# Patient Record
Sex: FEMALE | Race: WHITE | NOT HISPANIC OR LATINO | URBAN - METROPOLITAN AREA
[De-identification: names, ages, dates, MRNs, and addresses within clinical notes are randomized per-mention and may not be internally consistent; named-entity substitution may affect disease eponyms.]

---

## 2017-10-15 ENCOUNTER — EMERGENCY (EMERGENCY)
Facility: HOSPITAL | Age: 22
LOS: 0 days | Discharge: HOME | End: 2017-10-15

## 2017-10-15 DIAGNOSIS — R11.10 VOMITING, UNSPECIFIED: ICD-10-CM

## 2017-10-15 DIAGNOSIS — R10.9 UNSPECIFIED ABDOMINAL PAIN: ICD-10-CM

## 2017-10-15 DIAGNOSIS — R74.0 NONSPECIFIC ELEVATION OF LEVELS OF TRANSAMINASE AND LACTIC ACID DEHYDROGENASE [LDH]: ICD-10-CM

## 2017-10-15 DIAGNOSIS — F41.9 ANXIETY DISORDER, UNSPECIFIED: ICD-10-CM

## 2022-08-03 ENCOUNTER — INPATIENT (INPATIENT)
Facility: HOSPITAL | Age: 27
LOS: 0 days | Discharge: HOME HEALTH | End: 2022-08-04
Attending: SURGERY | Admitting: SURGERY

## 2022-08-03 ENCOUNTER — TRANSCRIPTION ENCOUNTER (OUTPATIENT)
Age: 27
End: 2022-08-03

## 2022-08-03 VITALS
SYSTOLIC BLOOD PRESSURE: 146 MMHG | DIASTOLIC BLOOD PRESSURE: 71 MMHG | HEART RATE: 104 BPM | OXYGEN SATURATION: 100 % | RESPIRATION RATE: 20 BRPM | TEMPERATURE: 100 F

## 2022-08-03 DIAGNOSIS — K61.1 RECTAL ABSCESS: Chronic | ICD-10-CM

## 2022-08-03 LAB
ALBUMIN SERPL ELPH-MCNC: 4.3 G/DL — SIGNIFICANT CHANGE UP (ref 3.5–5.2)
ALP SERPL-CCNC: 70 U/L — SIGNIFICANT CHANGE UP (ref 30–115)
ALT FLD-CCNC: 10 U/L — SIGNIFICANT CHANGE UP (ref 0–41)
ANION GAP SERPL CALC-SCNC: 15 MMOL/L — HIGH (ref 7–14)
APTT BLD: 22.5 SEC — CRITICAL LOW (ref 27–39.2)
AST SERPL-CCNC: 23 U/L — SIGNIFICANT CHANGE UP (ref 0–41)
BASOPHILS # BLD AUTO: 0.03 K/UL — SIGNIFICANT CHANGE UP (ref 0–0.2)
BASOPHILS NFR BLD AUTO: 0.1 % — SIGNIFICANT CHANGE UP (ref 0–1)
BILIRUB SERPL-MCNC: 0.5 MG/DL — SIGNIFICANT CHANGE UP (ref 0.2–1.2)
BLD GP AB SCN SERPL QL: SIGNIFICANT CHANGE UP
BUN SERPL-MCNC: 16 MG/DL — SIGNIFICANT CHANGE UP (ref 10–20)
CALCIUM SERPL-MCNC: 9.6 MG/DL — SIGNIFICANT CHANGE UP (ref 8.5–10.1)
CHLORIDE SERPL-SCNC: 101 MMOL/L — SIGNIFICANT CHANGE UP (ref 98–110)
CO2 SERPL-SCNC: 22 MMOL/L — SIGNIFICANT CHANGE UP (ref 17–32)
CREAT SERPL-MCNC: 2.1 MG/DL — HIGH (ref 0.7–1.5)
EGFR: 33 ML/MIN/1.73M2 — LOW
EOSINOPHIL # BLD AUTO: 0.01 K/UL — SIGNIFICANT CHANGE UP (ref 0–0.7)
EOSINOPHIL NFR BLD AUTO: 0 % — SIGNIFICANT CHANGE UP (ref 0–8)
GLUCOSE SERPL-MCNC: 121 MG/DL — HIGH (ref 70–99)
HCG SERPL QL: NEGATIVE — SIGNIFICANT CHANGE UP
HCT VFR BLD CALC: 35.6 % — LOW (ref 37–47)
HGB BLD-MCNC: 11.8 G/DL — LOW (ref 12–16)
IMM GRANULOCYTES NFR BLD AUTO: 0.7 % — HIGH (ref 0.1–0.3)
INR BLD: 1.41 RATIO — HIGH (ref 0.65–1.3)
LACTATE SERPL-SCNC: 1.6 MMOL/L — SIGNIFICANT CHANGE UP (ref 0.7–2)
LYMPHOCYTES # BLD AUTO: 1.27 K/UL — SIGNIFICANT CHANGE UP (ref 1.2–3.4)
LYMPHOCYTES # BLD AUTO: 6.1 % — LOW (ref 20.5–51.1)
MCHC RBC-ENTMCNC: 26.5 PG — LOW (ref 27–31)
MCHC RBC-ENTMCNC: 33.1 G/DL — SIGNIFICANT CHANGE UP (ref 32–37)
MCV RBC AUTO: 79.8 FL — LOW (ref 81–99)
MONOCYTES # BLD AUTO: 1.69 K/UL — HIGH (ref 0.1–0.6)
MONOCYTES NFR BLD AUTO: 8.2 % — SIGNIFICANT CHANGE UP (ref 1.7–9.3)
NEUTROPHILS # BLD AUTO: 17.55 K/UL — HIGH (ref 1.4–6.5)
NEUTROPHILS NFR BLD AUTO: 84.9 % — HIGH (ref 42.2–75.2)
NRBC # BLD: 0 /100 WBCS — SIGNIFICANT CHANGE UP (ref 0–0)
PLATELET # BLD AUTO: 384 K/UL — SIGNIFICANT CHANGE UP (ref 130–400)
POTASSIUM SERPL-MCNC: 4.3 MMOL/L — SIGNIFICANT CHANGE UP (ref 3.5–5)
POTASSIUM SERPL-SCNC: 4.3 MMOL/L — SIGNIFICANT CHANGE UP (ref 3.5–5)
PROT SERPL-MCNC: 7.1 G/DL — SIGNIFICANT CHANGE UP (ref 6–8)
PROTHROM AB SERPL-ACNC: 16.2 SEC — HIGH (ref 9.95–12.87)
RBC # BLD: 4.46 M/UL — SIGNIFICANT CHANGE UP (ref 4.2–5.4)
RBC # FLD: 15.1 % — HIGH (ref 11.5–14.5)
SARS-COV-2 RNA SPEC QL NAA+PROBE: SIGNIFICANT CHANGE UP
SODIUM SERPL-SCNC: 138 MMOL/L — SIGNIFICANT CHANGE UP (ref 135–146)
WBC # BLD: 20.7 K/UL — HIGH (ref 4.8–10.8)
WBC # FLD AUTO: 20.7 K/UL — HIGH (ref 4.8–10.8)

## 2022-08-03 PROCEDURE — 99285 EMERGENCY DEPT VISIT HI MDM: CPT

## 2022-08-03 PROCEDURE — 99222 1ST HOSP IP/OBS MODERATE 55: CPT

## 2022-08-03 PROCEDURE — 74177 CT ABD & PELVIS W/CONTRAST: CPT | Mod: 26,MA

## 2022-08-03 RX ORDER — ONDANSETRON 8 MG/1
4 TABLET, FILM COATED ORAL ONCE
Refills: 0 | Status: COMPLETED | OUTPATIENT
Start: 2022-08-03 | End: 2022-08-03

## 2022-08-03 RX ORDER — HYDROMORPHONE HYDROCHLORIDE 2 MG/ML
0.25 INJECTION INTRAMUSCULAR; INTRAVENOUS; SUBCUTANEOUS EVERY 4 HOURS
Refills: 0 | Status: DISCONTINUED | OUTPATIENT
Start: 2022-08-03 | End: 2022-08-03

## 2022-08-03 RX ORDER — KETOROLAC TROMETHAMINE 30 MG/ML
15 SYRINGE (ML) INJECTION ONCE
Refills: 0 | Status: DISCONTINUED | OUTPATIENT
Start: 2022-08-03 | End: 2022-08-03

## 2022-08-03 RX ORDER — CEFTRIAXONE 500 MG/1
1000 INJECTION, POWDER, FOR SOLUTION INTRAMUSCULAR; INTRAVENOUS ONCE
Refills: 0 | Status: COMPLETED | OUTPATIENT
Start: 2022-08-03 | End: 2022-08-03

## 2022-08-03 RX ORDER — CEFTRIAXONE 500 MG/1
INJECTION, POWDER, FOR SOLUTION INTRAMUSCULAR; INTRAVENOUS
Refills: 0 | Status: DISCONTINUED | OUTPATIENT
Start: 2022-08-03 | End: 2022-08-03

## 2022-08-03 RX ORDER — CEFTRIAXONE 500 MG/1
1000 INJECTION, POWDER, FOR SOLUTION INTRAMUSCULAR; INTRAVENOUS EVERY 24 HOURS
Refills: 0 | Status: CANCELLED | OUTPATIENT
Start: 2022-08-04 | End: 2022-08-03

## 2022-08-03 RX ORDER — ACETAMINOPHEN 500 MG
650 TABLET ORAL EVERY 6 HOURS
Refills: 0 | Status: DISCONTINUED | OUTPATIENT
Start: 2022-08-03 | End: 2022-08-03

## 2022-08-03 RX ORDER — SODIUM CHLORIDE 9 MG/ML
1000 INJECTION INTRAMUSCULAR; INTRAVENOUS; SUBCUTANEOUS ONCE
Refills: 0 | Status: COMPLETED | OUTPATIENT
Start: 2022-08-03 | End: 2022-08-03

## 2022-08-03 RX ORDER — OXYCODONE HYDROCHLORIDE 5 MG/1
5 TABLET ORAL EVERY 6 HOURS
Refills: 0 | Status: DISCONTINUED | OUTPATIENT
Start: 2022-08-03 | End: 2022-08-03

## 2022-08-03 RX ORDER — SODIUM CHLORIDE 9 MG/ML
1000 INJECTION, SOLUTION INTRAVENOUS ONCE
Refills: 0 | Status: COMPLETED | OUTPATIENT
Start: 2022-08-03 | End: 2022-08-03

## 2022-08-03 RX ORDER — SODIUM CHLORIDE 9 MG/ML
1000 INJECTION, SOLUTION INTRAVENOUS
Refills: 0 | Status: DISCONTINUED | OUTPATIENT
Start: 2022-08-03 | End: 2022-08-03

## 2022-08-03 RX ORDER — IBUPROFEN 200 MG
400 TABLET ORAL EVERY 8 HOURS
Refills: 0 | Status: DISCONTINUED | OUTPATIENT
Start: 2022-08-03 | End: 2022-08-03

## 2022-08-03 RX ORDER — HEPARIN SODIUM 5000 [USP'U]/ML
5000 INJECTION INTRAVENOUS; SUBCUTANEOUS EVERY 8 HOURS
Refills: 0 | Status: DISCONTINUED | OUTPATIENT
Start: 2022-08-03 | End: 2022-08-03

## 2022-08-03 RX ADMIN — Medication 100 MILLIGRAM(S): at 12:40

## 2022-08-03 RX ADMIN — CEFTRIAXONE 100 MILLIGRAM(S): 500 INJECTION, POWDER, FOR SOLUTION INTRAMUSCULAR; INTRAVENOUS at 20:40

## 2022-08-03 RX ADMIN — SODIUM CHLORIDE 1000 MILLILITER(S): 9 INJECTION INTRAMUSCULAR; INTRAVENOUS; SUBCUTANEOUS at 10:57

## 2022-08-03 RX ADMIN — SODIUM CHLORIDE 1000 MILLILITER(S): 9 INJECTION, SOLUTION INTRAVENOUS at 12:44

## 2022-08-03 RX ADMIN — ONDANSETRON 4 MILLIGRAM(S): 8 TABLET, FILM COATED ORAL at 10:57

## 2022-08-03 RX ADMIN — Medication 15 MILLIGRAM(S): at 11:50

## 2022-08-03 RX ADMIN — SODIUM CHLORIDE 125 MILLILITER(S): 9 INJECTION, SOLUTION INTRAVENOUS at 20:41

## 2022-08-03 NOTE — H&P ADULT - ATTENDING COMMENTS
Patient seen and examined with surgery resident in [place] awaiting bed and discussed management plans with patient and mother by bedside who speaks Egyptian.  Pain for 7-10 days seen as outpatient with local I & D and packing without relief on antibiotics. Clinically Right Perirectal abscess with small skin opening only. CT scan images reviewed and confirmed. Patient to go to OR for adequate drainage under general anesthesia as patient also had poor experience in [place] under local anesthesia

## 2022-08-03 NOTE — H&P ADULT - HISTORY OF PRESENT ILLNESS
27 y/o F with pmhx of previous perirectal abscess s/p I&D in 2018 now presents with 1 week of rectal pain. She was seen by a surgeon in [place] and was given a course of PO Clindamycin. Symptoms did not improve so she returned 3 days ago and underwent a small bedside I&D and was sent home. She denies bloody BMs or pain with defecation. Today, patient presented to [facility] ED for failure of symptoms to improve as well as a new onset of nausea, vomiting and fever/chills. CT scan showed a fluid collection on thr right extending to the rectum and anus.  27 y/o F with pmhx of previous perirectal abscess s/p I&D in 2018 now presents with 1 week of rectal pain. She was seen by a surgeon Dr. Chaudhry in [place] and was given a course of PO Clindamycin. Symptoms did not improve so she returned 3 days ago and underwent a small bedside I&D and was sent home. She denies bloody BMs or pain with defecation. Today, patient presented to [facility] ED for failure of symptoms to improve as well as a new onset of nausea, vomiting and fever/chills. CT scan showed a fluid collection on the right extending to the rectum and anus.

## 2022-08-03 NOTE — H&P ADULT - PATIENT'S GENDER IDENTITY
Activity:   Elevate your leg if swelling occurs in your ankle. Use elastic wraps/hose until swelling decreases.  Continue the exercise program as prescribed by physical therapists.  Take frequent walks.  Use walker, crutches, or cane with weight bearing instructions as indicated by the physical therapists.  Take rest periods often. Elevate leg during rest period.
Female

## 2022-08-03 NOTE — ED ADULT TRIAGE NOTE - CHIEF COMPLAINT QUOTE
+rectal abscess, patient states she had it slightly drained by her MD yesterday, +fever, patient on po abx

## 2022-08-03 NOTE — H&P ADULT - NSHPLABSRESULTS_GEN_ALL_CORE
Labs:  CAPILLARY BLOOD GLUCOSE                              11.8   20.70 )-----------( 384      ( 03 Aug 2022 10:36 )             35.6       Auto Neutrophil %: 84.9 % (08-03-22 @ 10:36)  Auto Immature Granulocyte %: 0.7 % (08-03-22 @ 10:36)    08-03    138  |  101  |  16  ----------------------------<  121<H>  4.3   |  22  |  2.1<H>      Calcium, Total Serum: 9.6 mg/dL (08-03-22 @ 10:36)      LFTs:             7.1  | 0.5  | 23       ------------------[70      ( 03 Aug 2022 10:36 )  4.3  | x    | 10          Lipase:x      Amylase:x         Lactate, Blood: 1.6 mmol/L (08-03-22 @ 10:36)      Coags:      RADIOLOGY:   < from: CT Abdomen and Pelvis w/ IV Cont (08.03.22 @ 12:25) >    :Irregular enhancing fluid collection within the perineum   asymmetric on the right extending to the anus and the rectum on the right.    Mild hepatosplenomegaly.    < end of copied text >

## 2022-08-03 NOTE — ED PROVIDER NOTE - NS ED ROS FT
Constitutional: + subjective fever/chills  Eyes: no redness/discharge/pain/vision changes  ENT: no rhinorrhea/ear pain/sore throat  Cardiac: No chest pain, SOB or edema.  Respiratory: No cough or respiratory distress  GI: + nbnb vomiting. No diarrhea or abdominal pain.  : No dysuria, frequency, urgency or hematuria  MS: no pain to back or extremities, no loss of ROM, no weakness  Neuro: No headache or weakness. No LOC.  Skin: No skin rash.  Endocrine: No history of thyroid disease or diabetes.  Except as documented in the HPI, all other systems are negative.

## 2022-08-03 NOTE — H&P ADULT - ASSESSMENT
25 y/o F with pmhx of previous perirectal abscess in 2018 presents with rectal pain. CT scan shows irregular fluid collection on right side of perineum extending to rectum and anus. WBC is 20. Patient very tender on exam in the area.     Plan:   NPO, IVF  pain and fever control   IV rocephin   add on and consent for OR for I&D of perirectal abscess    Above discussed with Surgical team, Attending, Dr. Monahan and patient.

## 2022-08-03 NOTE — ED PROVIDER NOTE - OBJECTIVE STATEMENT
26 year old F with hx of rectal abscess 2018 s/p I &D c/o rectal abscess x 1.5 week. Pt went to see surgeon in [place] last Thursday and was started on Clindamycin. Symptoms were not improving so returned to office yesterday had I &D performed with packing placed. Since last night pt has had worsening pain to area, subjective fever/chills, nausea, vomiting and lower abd cramping. No bloody stools, chest pain, sob, lightheadedness, syncope, urinary symptoms.

## 2022-08-03 NOTE — ED PROVIDER NOTE - NSTIMEPROVIDERCAREINITIATE_GEN_ER
03-Aug-2022 10:05 patient presents for COVID exposure.  As patient is nontoxic appearing will test for COVID and d/c.  Quarantine reviewed and return precautions reviewed.

## 2022-08-03 NOTE — ED PROVIDER NOTE - NS ED ATTENDING STATEMENT MOD
This was a shared visit with the WINIFRED. I reviewed and verified the documentation and independently performed the documented:

## 2022-08-03 NOTE — ED PROVIDER NOTE - CLINICAL SUMMARY MEDICAL DECISION MAKING FREE TEXT BOX
CT confirms deep space perineal abscess bordering the anus and rectum.  Surgery consulted.  ADMIT.  For OR intervention this PM.  VSS.

## 2022-08-03 NOTE — ED PROVIDER NOTE - SHIFT CHANGE DETAILS
Patient with CT evidence of perirectal abscess.  WBC = 20K.  IV abx given.  Surgery consult pending.

## 2022-08-03 NOTE — H&P ADULT - NSHPPHYSICALEXAM_GEN_ALL_CORE
PHYSICAL EXAM:  GENERAL: NAD, well-appearing  CHEST/LUNG: Clear to auscultation bilaterally  HEART: Regular rate and rhythm  ABDOMEN: Soft, Nontender, Nondistended;   EXTREMITIES:  No clubbing, cyanosis, or edema  RECTAL: tenderness to palpation right perianal region, mild surrounding erythema PHYSICAL EXAM:  GENERAL: NAD, well-appearing  CHEST/LUNG: Clear to auscultation bilaterally  HEART: Regular rate and rhythm  ABDOMEN: Soft, Nontender, Nondistended;   EXTREMITIES:  No clubbing, cyanosis, or edema  RECTAL: tenderness to palpation right perianal region, mild surrounding erythema small packing superficial in area

## 2022-08-03 NOTE — ED PROVIDER NOTE - PHYSICAL EXAMINATION
CONSTITUTIONAL: Well-appearing; well-nourished; in no apparent distress.   EYES: PERRL; EOM intact.   ENT: normal nose; no rhinorrhea; normal pharynx with no tonsillar hypertrophy.   NECK: Supple; non-tender; no cervical lymphadenopathy. N  CARDIOVASCULAR: Normal S1, S2; no murmurs, rubs, or gallops.   RESPIRATORY: Normal chest excursion with respiration; breath sounds clear and equal bilaterally; no wheezes, rhonchi, or rales.  GI/: Normal bowel sounds; non-distended; non-tender; no palpable organomegaly. Rectal exam chaperoned by Dr. Florez: + perirectal abscess 2 x 2 cm noted at 1o clock. + packing in place. No bleeding no drainage.   MS: No evidence of trauma or deformity. Normal ROM in all four extremities; non-tender to palpation; distal pulses are normal.   SKIN: Normal for age and race; warm; dry; good turgor; no apparent lesions or exudate.   NEURO/PSYCH: A & O x 4; grossly unremarkable. mood and manner are appropriate.

## 2022-08-04 ENCOUNTER — TRANSCRIPTION ENCOUNTER (OUTPATIENT)
Age: 27
End: 2022-08-04

## 2022-08-04 VITALS
TEMPERATURE: 97 F | RESPIRATION RATE: 18 BRPM | HEART RATE: 81 BPM | OXYGEN SATURATION: 98 % | DIASTOLIC BLOOD PRESSURE: 85 MMHG | SYSTOLIC BLOOD PRESSURE: 136 MMHG

## 2022-08-04 LAB
ANION GAP SERPL CALC-SCNC: 11 MMOL/L — SIGNIFICANT CHANGE UP (ref 7–14)
BASOPHILS # BLD AUTO: 0.04 K/UL — SIGNIFICANT CHANGE UP (ref 0–0.2)
BASOPHILS NFR BLD AUTO: 0.2 % — SIGNIFICANT CHANGE UP (ref 0–1)
BUN SERPL-MCNC: 14 MG/DL — SIGNIFICANT CHANGE UP (ref 10–20)
CALCIUM SERPL-MCNC: 9.3 MG/DL — SIGNIFICANT CHANGE UP (ref 8.5–10.1)
CHLORIDE SERPL-SCNC: 104 MMOL/L — SIGNIFICANT CHANGE UP (ref 98–110)
CO2 SERPL-SCNC: 22 MMOL/L — SIGNIFICANT CHANGE UP (ref 17–32)
CREAT SERPL-MCNC: 1.2 MG/DL — SIGNIFICANT CHANGE UP (ref 0.7–1.5)
EGFR: 64 ML/MIN/1.73M2 — SIGNIFICANT CHANGE UP
EOSINOPHIL # BLD AUTO: 0.06 K/UL — SIGNIFICANT CHANGE UP (ref 0–0.7)
EOSINOPHIL NFR BLD AUTO: 0.3 % — SIGNIFICANT CHANGE UP (ref 0–8)
GLUCOSE SERPL-MCNC: 130 MG/DL — HIGH (ref 70–99)
HCT VFR BLD CALC: 31.6 % — LOW (ref 37–47)
HGB BLD-MCNC: 10.4 G/DL — LOW (ref 12–16)
IMM GRANULOCYTES NFR BLD AUTO: 0.7 % — HIGH (ref 0.1–0.3)
LYMPHOCYTES # BLD AUTO: 1.1 K/UL — LOW (ref 1.2–3.4)
LYMPHOCYTES # BLD AUTO: 4.8 % — LOW (ref 20.5–51.1)
MAGNESIUM SERPL-MCNC: 2.1 MG/DL — SIGNIFICANT CHANGE UP (ref 1.8–2.4)
MCHC RBC-ENTMCNC: 26.5 PG — LOW (ref 27–31)
MCHC RBC-ENTMCNC: 32.9 G/DL — SIGNIFICANT CHANGE UP (ref 32–37)
MCV RBC AUTO: 80.6 FL — LOW (ref 81–99)
MONOCYTES # BLD AUTO: 0.92 K/UL — HIGH (ref 0.1–0.6)
MONOCYTES NFR BLD AUTO: 4 % — SIGNIFICANT CHANGE UP (ref 1.7–9.3)
NEUTROPHILS # BLD AUTO: 20.59 K/UL — HIGH (ref 1.4–6.5)
NEUTROPHILS NFR BLD AUTO: 90 % — HIGH (ref 42.2–75.2)
NRBC # BLD: 0 /100 WBCS — SIGNIFICANT CHANGE UP (ref 0–0)
PHOSPHATE SERPL-MCNC: 5.6 MG/DL — HIGH (ref 2.1–4.9)
PLATELET # BLD AUTO: 343 K/UL — SIGNIFICANT CHANGE UP (ref 130–400)
POTASSIUM SERPL-MCNC: 4.3 MMOL/L — SIGNIFICANT CHANGE UP (ref 3.5–5)
POTASSIUM SERPL-SCNC: 4.3 MMOL/L — SIGNIFICANT CHANGE UP (ref 3.5–5)
RBC # BLD: 3.92 M/UL — LOW (ref 4.2–5.4)
RBC # FLD: 15.2 % — HIGH (ref 11.5–14.5)
SODIUM SERPL-SCNC: 137 MMOL/L — SIGNIFICANT CHANGE UP (ref 135–146)
WBC # BLD: 22.86 K/UL — HIGH (ref 4.8–10.8)
WBC # FLD AUTO: 22.86 K/UL — HIGH (ref 4.8–10.8)

## 2022-08-04 PROCEDURE — 46040 I&D ISCHIORCT&/PERIRCT ABSC: CPT

## 2022-08-04 RX ORDER — ACETAMINOPHEN 500 MG
2 TABLET ORAL
Qty: 56 | Refills: 0
Start: 2022-08-04 | End: 2022-08-10

## 2022-08-04 RX ORDER — MOXIFLOXACIN HYDROCHLORIDE TABLETS, 400 MG 400 MG/1
1 TABLET, FILM COATED ORAL
Qty: 14 | Refills: 0
Start: 2022-08-04 | End: 2022-08-10

## 2022-08-04 RX ORDER — OXYCODONE HYDROCHLORIDE 5 MG/1
1 TABLET ORAL
Qty: 12 | Refills: 0
Start: 2022-08-04 | End: 2022-08-06

## 2022-08-04 RX ORDER — HYDROMORPHONE HYDROCHLORIDE 2 MG/ML
0.25 INJECTION INTRAMUSCULAR; INTRAVENOUS; SUBCUTANEOUS EVERY 4 HOURS
Refills: 0 | Status: DISCONTINUED | OUTPATIENT
Start: 2022-08-04 | End: 2022-08-04

## 2022-08-04 RX ORDER — METRONIDAZOLE 500 MG
100 TABLET ORAL
Qty: 0 | Refills: 0 | DISCHARGE
Start: 2022-08-04

## 2022-08-04 RX ORDER — OXYCODONE HYDROCHLORIDE 5 MG/1
5 TABLET ORAL EVERY 6 HOURS
Refills: 0 | Status: DISCONTINUED | OUTPATIENT
Start: 2022-08-04 | End: 2022-08-04

## 2022-08-04 RX ORDER — METRONIDAZOLE 500 MG
1 TABLET ORAL
Qty: 21 | Refills: 0
Start: 2022-08-04 | End: 2022-08-10

## 2022-08-04 RX ORDER — METRONIDAZOLE 500 MG
500 TABLET ORAL EVERY 8 HOURS
Refills: 0 | Status: DISCONTINUED | OUTPATIENT
Start: 2022-08-04 | End: 2022-08-04

## 2022-08-04 RX ORDER — HEPARIN SODIUM 5000 [USP'U]/ML
5000 INJECTION INTRAVENOUS; SUBCUTANEOUS EVERY 8 HOURS
Refills: 0 | Status: DISCONTINUED | OUTPATIENT
Start: 2022-08-04 | End: 2022-08-04

## 2022-08-04 RX ORDER — SODIUM CHLORIDE 9 MG/ML
1000 INJECTION, SOLUTION INTRAVENOUS
Refills: 0 | Status: DISCONTINUED | OUTPATIENT
Start: 2022-08-04 | End: 2022-08-04

## 2022-08-04 RX ORDER — CEFTRIAXONE 500 MG/1
1000 INJECTION, POWDER, FOR SOLUTION INTRAMUSCULAR; INTRAVENOUS EVERY 24 HOURS
Refills: 0 | Status: DISCONTINUED | OUTPATIENT
Start: 2022-08-04 | End: 2022-08-04

## 2022-08-04 RX ORDER — ACETAMINOPHEN 500 MG
650 TABLET ORAL EVERY 6 HOURS
Refills: 0 | Status: DISCONTINUED | OUTPATIENT
Start: 2022-08-04 | End: 2022-08-04

## 2022-08-04 RX ORDER — HYDROMORPHONE HYDROCHLORIDE 2 MG/ML
0.5 INJECTION INTRAMUSCULAR; INTRAVENOUS; SUBCUTANEOUS ONCE
Refills: 0 | Status: DISCONTINUED | OUTPATIENT
Start: 2022-08-04 | End: 2022-08-04

## 2022-08-04 RX ORDER — IBUPROFEN 200 MG
1 TABLET ORAL
Qty: 12 | Refills: 0
Start: 2022-08-04 | End: 2022-08-07

## 2022-08-04 RX ORDER — HYDROMORPHONE HYDROCHLORIDE 2 MG/ML
0.5 INJECTION INTRAMUSCULAR; INTRAVENOUS; SUBCUTANEOUS
Refills: 0 | Status: DISCONTINUED | OUTPATIENT
Start: 2022-08-04 | End: 2022-08-04

## 2022-08-04 RX ADMIN — Medication 650 MILLIGRAM(S): at 18:44

## 2022-08-04 RX ADMIN — Medication 650 MILLIGRAM(S): at 06:50

## 2022-08-04 RX ADMIN — OXYCODONE HYDROCHLORIDE 5 MILLIGRAM(S): 5 TABLET ORAL at 08:11

## 2022-08-04 RX ADMIN — HYDROMORPHONE HYDROCHLORIDE 0.5 MILLIGRAM(S): 2 INJECTION INTRAMUSCULAR; INTRAVENOUS; SUBCUTANEOUS at 01:20

## 2022-08-04 RX ADMIN — SODIUM CHLORIDE 125 MILLILITER(S): 9 INJECTION, SOLUTION INTRAVENOUS at 10:00

## 2022-08-04 RX ADMIN — CEFTRIAXONE 100 MILLIGRAM(S): 500 INJECTION, POWDER, FOR SOLUTION INTRAMUSCULAR; INTRAVENOUS at 01:30

## 2022-08-04 RX ADMIN — OXYCODONE HYDROCHLORIDE 5 MILLIGRAM(S): 5 TABLET ORAL at 08:12

## 2022-08-04 RX ADMIN — HYDROMORPHONE HYDROCHLORIDE 0.5 MILLIGRAM(S): 2 INJECTION INTRAMUSCULAR; INTRAVENOUS; SUBCUTANEOUS at 18:41

## 2022-08-04 RX ADMIN — HYDROMORPHONE HYDROCHLORIDE 0.25 MILLIGRAM(S): 2 INJECTION INTRAMUSCULAR; INTRAVENOUS; SUBCUTANEOUS at 13:45

## 2022-08-04 RX ADMIN — Medication 650 MILLIGRAM(S): at 07:36

## 2022-08-04 RX ADMIN — HYDROMORPHONE HYDROCHLORIDE 0.5 MILLIGRAM(S): 2 INJECTION INTRAMUSCULAR; INTRAVENOUS; SUBCUTANEOUS at 01:05

## 2022-08-04 NOTE — DISCHARGE NOTE PROVIDER - NSDCCPTREATMENT_GEN_ALL_CORE_FT
PRINCIPAL PROCEDURE  Procedure: Incision, perianal abscess  Findings and Treatment: 35cc of pus drained

## 2022-08-04 NOTE — PROGRESS NOTE ADULT - ATTENDING COMMENTS
Patient seen and examined with surgery PA on rounds and discussed management plans.   comfortable dressing in place Packing to be changed today prior to discharge on po antibiotics and outpatient followup instructions given

## 2022-08-04 NOTE — PATIENT PROFILE ADULT - FALL HARM RISK - HARM RISK INTERVENTIONS

## 2022-08-04 NOTE — DISCHARGE NOTE NURSING/CASE MANAGEMENT/SOCIAL WORK - PATIENT PORTAL LINK FT
You can access the FollowMyHealth Patient Portal offered by [facility] by registering at the following website: [email or web address]. By joining [facility]’s FollowMyHealth portal, you will also be able to view your health information using other applications (apps) compatible with our system.

## 2022-08-04 NOTE — CHART NOTE - NSCHARTNOTEFT_GEN_A_CORE
PACU ANESTHESIA ADMISSION NOTE      Procedure: Incision, perianal abscess      Post op diagnosis:  Perianal abscess            __x__  Patent Airway    __x__  Full return of protective reflexes    _x___  Full recovery from anesthesia / back to baseline     Vitals:   T: 98.5           R:      12            BP:   117/55               Sat:    100%               P: 95      Mental Status:  ___x_ Awake   __x___ Alert   _____ Drowsy   _____ Sedated    Nausea/Vomiting:  ____ NO  ______Yes,   x   See Post - Op Orders          Pain Scale (0-10):  _____    Treatment: ____ None    _x___ See Post - Op/PCA Orders    Post - Operative Fluids:   ____ Oral   ___x_ See Post - Op Orders    Plan: Discharge:   ____Home       __x___Floor     _____Critical Care    _____  Other:_________________    Comments:    Pt tolerated procedure well, no anesthesia related complications. Care of pt endorsed to PACU, report given to PACU RN. Discharge when criteria are met.

## 2022-08-04 NOTE — PROGRESS NOTE ADULT - SUBJECTIVE AND OBJECTIVE BOX
GENERAL SURGERY PROGRESS NOTE    Patient: NESSA QUINN , 26y ([birth year])Female   MRN: [id number]  Location: [address]  Visit: 08-03-22 Inpatient  Date: 08-04-22 @ 04:04    Hospital Day #: 2  Post-Op Day #:1     Procedure/Dx/Injuries: I&D     Events of past 24 hours:    NAEON  Patient seen and examined at bedside  no OOB, no nausea no vomiting  pain well controlled, sleeping comfortably  no BM, no flatus, vitals WNL    PAST MEDICAL & SURGICAL HISTORY:  No pertinent past medical history    Perirectal abscess    Vitals:   T(F): 98.7 (08-04-22 @ 00:40), Max: 99.5 (08-03-22 @ 09:47)  HR: 70 (08-04-22 @ 02:40)  BP: 108/54 (08-04-22 @ 02:40)  RR: 18 (08-04-22 @ 02:40)  SpO2: 98% (08-04-22 @ 02:40)      Diet, Regular  Diet, NPO:   Except Medications     Special Instructions for Nursing:  Except Medications      Fluids: lactated ringers.: Solution, 1000 milliLiter(s) infuse at 125 mL/Hr  lactated ringers.: Solution, 1000 milliLiter(s) infuse at 75 mL/Hr      I & O's:    Physical Examination:  General Appearance: NAD  HEENT: EOMI, sclera non-icteric.  Heart: RRR  Lungs: CTABL  Abdomen:  Soft, nontender, nondistended. No rigidity, guarding, or rebound tenderness.   MSK/Extremities: Warm & well-perfused. Peripheral pulses intact.  Skin: Warm, dry. No jaundice.   Incisions/Wounds: Dressings in place, clean, dry and intact, no signs of infection/active bleeding/drainage      MEDICATIONS  (STANDING):  acetaminophen     Tablet .. 650 milliGRAM(s) Oral every 6 hours  cefTRIAXone   IVPB 1000 milliGRAM(s) IV Intermittent every 24 hours  heparin   Injectable 5000 Unit(s) SubCutaneous every 8 hours  lactated ringers. 1000 milliLiter(s) (75 mL/Hr) IV Continuous <Continuous>  lactated ringers. 1000 milliLiter(s) (125 mL/Hr) IV Continuous <Continuous>    MEDICATIONS  (PRN):  HYDROmorphone  Injectable 0.5 milliGRAM(s) IV Push every 10 minutes PRN Moderate Pain (4 - 6)  HYDROmorphone  Injectable 0.25 milliGRAM(s) IV Push every 4 hours PRN Severe Pain (7 - 10)  oxyCODONE    IR 5 milliGRAM(s) Oral every 6 hours PRN Moderate Pain (4 - 6)      DVT PROPHYLAXIS: heparin   Injectable 5000 Unit(s) SubCutaneous every 8 hours    GI PROPHYLAXIS:   ANTICOAGULATION:   ANTIBIOTICS:  cefTRIAXone   IVPB 1000 milliGRAM(s)        LAB/STUDIES:  Labs:  CAPILLARY BLOOD GLUCOSE                              11.8   20.70 )-----------( 384      ( 03 Aug 2022 10:36 )             35.6       Auto Neutrophil %: 84.9 % (08-03-22 @ 10:36)  Auto Immature Granulocyte %: 0.7 % (08-03-22 @ 10:36)    08-03    138  |  101  |  16  ----------------------------<  121<H>  4.3   |  22  |  2.1<H>      Calcium, Total Serum: 9.6 mg/dL (08-03-22 @ 10:36)      LFTs:             7.1  | 0.5  | 23       ------------------[70      ( 03 Aug 2022 10:36 )  4.3  | x    | 10          Lipase:x      Amylase:x         Lactate, Blood: 1.6 mmol/L (08-03-22 @ 10:36)      Coags:     16.20  ----< 1.41    ( 03 Aug 2022 20:03 )     22.5

## 2022-08-04 NOTE — DISCHARGE NOTE NURSING/CASE MANAGEMENT/SOCIAL WORK - NSDCPEFALRISK_GEN_ALL_CORE
For information on Fall & Injury Prevention, visit: [email or web address] OR  [email or web address] OR  https://www.cdc.gov/steadi/patient.html

## 2022-08-04 NOTE — DISCHARGE NOTE PROVIDER - HOSPITAL COURSE
25 y/o F with pmhx of previous perirectal abscess s/p I&D in 2018 now presents with 1 week of rectal pain. She was seen by a surgeon Dr. Chaudhry in [place] and was given a course of PO Clindamycin. Symptoms did not improve so she returned 3 days ago and underwent a small bedside I&D and was sent home. She denies bloody BMs or pain with defecation. Today, patient presented to [facility] ED for failure of symptoms to improve as well as a new onset of nausea, vomiting and fever/chills. CT scan showed a fluid collection on the right extending to the rectum and anus. Patient to go to OR for adequate drainage under general anesthesia as patient also had poor experience in [place] under local anesthesia .    OR Findings- Incision, perianal abscess: Right perirectal abscess w/ 35cc pus.    Patient tolerated procedure well, seen at bedside AAOX3, NAD reporting some pain with movement. Wound evaluated with packing in place, ABX IV given and tolerating diet. Plan is to DC today with VNS for daily packing of wound, PO ABX and pain control. No further intervention needed, will follow outpatient with Dr Monahan.

## 2022-08-04 NOTE — DISCHARGE NOTE PROVIDER - NSDCCPCAREPLAN_GEN_ALL_CORE_FT
PRINCIPAL DISCHARGE DIAGNOSIS  Diagnosis: Rectal abscess  Assessment and Plan of Treatment: -Diet: Continue regular diet.  -Activity: Avoid heavy lifting or straining (anything over 10-15 pounds) for at least 6 weeks. You may ambulate and otherwise resume normal daily activities as tolerated.   -Incisions: Daily packing changes with plain packing, VNS has been set up by case management. You may shower in 48hrs post operative. Keep site dry. Please avoid scrubbing the areas and do not submerge your incisions in water for at least 2 weeks.  -Medications: You may resume your home medications. You may take Tylenol 650mg every 6 hours and alternate with Ibuprofen 600mg every 8 hours for pain. A prescription has been sent to your pharmacy for Oxycodone 5mg every 6 hours only as needed for severe breakthough pain after taking Tylenol and Motrin. Please do not use this medication when driving or operating heavy machinery as it can make your drowsy.  -Follow-up: Please call the office to schedule a follow-up appointment with Dr. Monahan within 1week.  -Please call the office or return to the ED with persistent fever greater than 100.4F, chest pain, shortness of breath, uncontrollable nausea/vomiting/abdominal pain, constipation, bloody bowel movements, abdominal distention, inability to tolerate oral intake.

## 2022-08-04 NOTE — DISCHARGE NOTE PROVIDER - CARE PROVIDER_API CALL
Raeann Monahan)  Surgery  [address] [place] [address]  Phone: [phone]  Fax: [phone]  Follow Up Time:

## 2022-08-04 NOTE — CHART NOTE - NSCHARTNOTEFT_GEN_A_CORE
Post Operative Note  Patient: NESSA QUINN 26y (1995) Female   MRN: [id number]  Location: [place] [address] [address]  Visit: 08-03-22 Inpatient  Date: 08-04-22 @ 04:01    Procedure: S/P I&D sharron-rectal abscess    Subjective:   NAEON  Patient seen and examined at bedside  no OOB, no nausea no vomiting  pain well controlled, sleeping comfortably  no BM, no flatus, vitals WNL    Objective:  Vitals: T(F): 98.7 (08-04-22 @ 00:40), Max: 99.5 (08-03-22 @ 09:47)  HR: 70 (08-04-22 @ 02:40)  BP: 108/54 (08-04-22 @ 02:40) (108/54 - 146/71)  RR: 18 (08-04-22 @ 02:40)  SpO2: 98% (08-04-22 @ 02:40)  Vent Settings:     In:   IV Fluids: lactated ringers. 1000 milliLiter(s) (125 mL/Hr) IV Continuous <Continuous>  lactated ringers. 1000 milliLiter(s) (75 mL/Hr) IV Continuous <Continuous>      Physical Examination:  General Appearance: NAD  HEENT: EOMI, sclera non-icteric.  Heart: RRR  Lungs: CTABL  Abdomen:  Soft, nontender, nondistended. No rigidity, guarding, or rebound tenderness.   MSK/Extremities: Warm & well-perfused. Peripheral pulses intact.  Skin: Warm, dry. No jaundice.   Incisions/Wounds: Dressings in place, clean, dry and intact, no signs of infection/active bleeding/drainage    Medications: [Standing]  acetaminophen     Tablet .. 650 milliGRAM(s) Oral every 6 hours  cefTRIAXone   IVPB 1000 milliGRAM(s) IV Intermittent every 24 hours  heparin   Injectable 5000 Unit(s) SubCutaneous every 8 hours  HYDROmorphone  Injectable 0.5 milliGRAM(s) IV Push every 10 minutes PRN  HYDROmorphone  Injectable 0.25 milliGRAM(s) IV Push every 4 hours PRN  lactated ringers. 1000 milliLiter(s) IV Continuous <Continuous>  lactated ringers. 1000 milliLiter(s) IV Continuous <Continuous>  oxyCODONE    IR 5 milliGRAM(s) Oral every 6 hours PRN    Medications: [PRN]  acetaminophen     Tablet .. 650 milliGRAM(s) Oral every 6 hours  cefTRIAXone   IVPB 1000 milliGRAM(s) IV Intermittent every 24 hours  heparin   Injectable 5000 Unit(s) SubCutaneous every 8 hours  HYDROmorphone  Injectable 0.5 milliGRAM(s) IV Push every 10 minutes PRN  HYDROmorphone  Injectable 0.25 milliGRAM(s) IV Push every 4 hours PRN  lactated ringers. 1000 milliLiter(s) IV Continuous <Continuous>  lactated ringers. 1000 milliLiter(s) IV Continuous <Continuous>  oxyCODONE    IR 5 milliGRAM(s) Oral every 6 hours PRN    Labs:                        11.8   20.70 )-----------( 384      ( 03 Aug 2022 10:36 )             35.6     08-03    138  |  101  |  16  ----------------------------<  121<H>  4.3   |  22  |  2.1<H>    Ca    9.6      03 Aug 2022 10:36    TPro  7.1  /  Alb  4.3  /  TBili  0.5  /  DBili  x   /  AST  23  /  ALT  10  /  AlkPhos  70  08-03    PT/INR - ( 03 Aug 2022 20:03 )   PT: 16.20 sec;   INR: 1.41 ratio         PTT - ( 03 Aug 2022 20:03 )  PTT:22.5 sec      Imaging:  No post-op imaging studies    Assessment:  26yFemale patient S/P I&D for perirectal abscess.     Plan:  -Continue to monitor progress  -may discharge in morning with wound care instructions if stable and improving  -Encourage OOB  -Monitor UOP,BM  -Monitor Vitals    Date/Time: 08-04-22 @ 04:01

## 2022-08-04 NOTE — DISCHARGE NOTE PROVIDER - NSDCMRMEDTOKEN_GEN_ALL_CORE_FT
acetaminophen 325 mg oral tablet: 2 tab(s) orally every 6 hours  Alivio 600 mg oral tablet: 1 tab(s) orally every 8 hours   Cipro 500 mg oral tablet: 1 tab(s) orally 2 times a day   metroNIDAZOLE 500 mg oral tablet: 1 tab(s) orally every 8 hours   oxyCODONE 5 mg oral tablet: 1 tab(s) orally every 6 hours, As Needed -Moderate Pain (4 - 6) - for severe pain MDD:4    acetaminophen 325 mg oral tablet: 2 tab(s) orally every 6 hours  Alivio 600 mg oral tablet: 1 tab(s) orally every 8 hours   Cipro 500 mg oral tablet: 1 tab(s) orally 2 times a day   metroNIDAZOLE 500 mg oral tablet: 1 tab(s) orally every 8 hours   metroNIDAZOLE 500 mg/100 mL intravenous solution: 100 milliliter(s) intravenous every 8 hours  oxyCODONE 5 mg oral tablet: 1 tab(s) orally every 6 hours, As Needed -Moderate Pain (4 - 6) - for severe pain MDD:4

## 2022-08-04 NOTE — PROGRESS NOTE ADULT - ASSESSMENT
Assessment:  26yFemale patient S/P I&D for perirectal abscess.     Plan:  -Continue to monitor progress  -Encourage IS  -may discharge in morning with wound care instructions if stable and improving  -Encourage OOB  -Monitor UOP,BM  -Monitor Vitals

## 2022-08-09 DIAGNOSIS — K61.1 RECTAL ABSCESS: ICD-10-CM

## 2022-08-09 DIAGNOSIS — Z20.822 CONTACT WITH AND (SUSPECTED) EXPOSURE TO COVID-19: ICD-10-CM

## 2022-08-10 PROBLEM — Z00.00 ENCOUNTER FOR PREVENTIVE HEALTH EXAMINATION: Status: ACTIVE | Noted: 2022-08-10

## 2022-08-10 PROBLEM — Z78.9 OTHER SPECIFIED HEALTH STATUS: Chronic | Status: ACTIVE | Noted: 2022-08-03

## 2022-08-18 ENCOUNTER — APPOINTMENT (OUTPATIENT)
Dept: SURGERY | Facility: CLINIC | Age: 27
End: 2022-08-18

## 2022-08-18 VITALS
OXYGEN SATURATION: 99 % | HEART RATE: 62 BPM | DIASTOLIC BLOOD PRESSURE: 78 MMHG | TEMPERATURE: 96.9 F | BODY MASS INDEX: 22.15 KG/M2 | WEIGHT: 125 LBS | HEIGHT: 63 IN | SYSTOLIC BLOOD PRESSURE: 109 MMHG

## 2022-08-18 PROCEDURE — 99024 POSTOP FOLLOW-UP VISIT: CPT

## 2022-09-01 NOTE — PHYSICAL EXAM
[de-identified] : Young female ambulatory in no discomfort [de-identified] : open wound 1 cm perianal area clean without cellulitis, no drainage

## 2022-09-01 NOTE — ASSESSMENT
[FreeTextEntry1] : Satisfactory postoperative course for this 6-year-old female with a recurrent perianal abscess which was drained in the operating room. Is small and does not require any further packing the patient was instructed regarding sitz bath and followup care and return to office in 4 weeks if any concerns

## 2022-09-01 NOTE — HISTORY OF PRESENT ILLNESS
[de-identified] : This 26-year-old female presents for follow up following discharge from [facility]. Patient was hospitalized for recurrent perianal abscess which was drained under anesthesia. Packing was placed and was being followed by visiting nurse followup dressing changes [de-identified] : improved pain with minimal drainage now

## 2022-09-15 ENCOUNTER — APPOINTMENT (OUTPATIENT)
Dept: SURGERY | Facility: CLINIC | Age: 27
End: 2022-09-15

## 2022-09-15 DIAGNOSIS — K61.2 ANORECTAL ABSCESS: ICD-10-CM

## 2022-09-15 PROCEDURE — 99024 POSTOP FOLLOW-UP VISIT: CPT

## 2022-09-15 NOTE — ASSESSMENT
[FreeTextEntry1] : Satisfactory postoperative course for this 26-year-old female with a recurrent perianal abscess which was drained in the operating room.  wound has healed well now no further followup needed.

## 2022-09-15 NOTE — PHYSICAL EXAM
[de-identified] : Young female ambulatory in no discomfort [de-identified] : No residual wound over perianal area,  clean without cellulitis, no drainage

## 2022-09-15 NOTE — HISTORY OF PRESENT ILLNESS
[de-identified] : This 26-year-old female presents for follow up following discharge from [facility]. Patient was hospitalized for recurrent perianal abscess which was drained under anesthesia. Packing was placed and was being followed by visiting nurse followup dressing changes. Patient was seen last month [de-identified] : No pain or drainage now
